# Patient Record
Sex: MALE | Race: WHITE | NOT HISPANIC OR LATINO | Employment: OTHER | URBAN - METROPOLITAN AREA
[De-identification: names, ages, dates, MRNs, and addresses within clinical notes are randomized per-mention and may not be internally consistent; named-entity substitution may affect disease eponyms.]

---

## 2019-04-30 ENCOUNTER — OFFICE VISIT (OUTPATIENT)
Dept: OPHTHALMOLOGY | Facility: CLINIC | Age: 34
End: 2019-04-30
Attending: OPHTHALMOLOGY
Payer: MEDICAID

## 2019-04-30 DIAGNOSIS — D31.41 IRIS NEVUS, RIGHT: Primary | ICD-10-CM

## 2019-04-30 PROCEDURE — G0463 HOSPITAL OUTPT CLINIC VISIT: HCPCS | Mod: ZF

## 2019-04-30 PROCEDURE — 92285 EXTERNAL OCULAR PHOTOGRAPHY: CPT | Mod: ZF | Performed by: OPHTHALMOLOGY

## 2019-04-30 PROCEDURE — 76513 OPH US DX ANT SGM US UNI/BI: CPT | Mod: ZF | Performed by: OPHTHALMOLOGY

## 2019-04-30 ASSESSMENT — TONOMETRY
OS_IOP_MMHG: 17
OD_IOP_MMHG: 18
IOP_METHOD: ICARE

## 2019-04-30 ASSESSMENT — VISUAL ACUITY
OD_SC: 20/20
METHOD: SNELLEN - LINEAR
OS_SC: 20/20

## 2019-04-30 ASSESSMENT — CUP TO DISC RATIO
OS_RATIO: NORMAL
OD_RATIO: NORMAL

## 2019-04-30 ASSESSMENT — SLIT LAMP EXAM - LIDS
COMMENTS: NORMAL
COMMENTS: NORMAL

## 2019-04-30 ASSESSMENT — CONF VISUAL FIELD
OS_NORMAL: 1
OD_NORMAL: 1

## 2019-04-30 NOTE — PROGRESS NOTES
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 Last seen 2015 with Dr. Pa. No new eye redness, changes in vision. Thinks the nevus is the same.     Recently had appendectomy and still recovering. Noticed some floaters each eye while hospitalized, but denies any flashes or black curtain.    Gtts:  None    POH:  -no prior surgery  -iris nevus right eye    A/P:   1. Iris nevus, right eye  -SLP, gonio and UBM done today for comparison  -appears stable on all testing compared to 2015 and 2012  -continue to monitor q1-2 years  -recommend UV protection    2. Vitreous degeneration, each eye  -stable, no PVD  -no RT/RD  -reviewed precautions for RT/RD    F/u 1-2 years, SLP, gonio, UBM OD      Yris Patricio MD  PGY-5, Cornea Fellow  Ophthalmology    Complete documentation of historical and exam elements from today's encounter can be found in the full encounter summary report (not reduplicated in this progress note). I personally obtained the chief complaint(s) and history of present illness.  I confirmed and edited as necessary the review of systems, past medical/surgical history, family history, social history, and examination findings as documented by others; and I examined the patient myself. I personally reviewed the relevant tests, images, and reports as documented above. I formulated and edited as necessary the assessment and plan and discussed the findings and management plan with the patient and family.     Yris Patricio MD  Cornea Fellow

## 2019-04-30 NOTE — NURSING NOTE
Chief Complaints and History of Present Illnesses   Patient presents with     Follow Up     Chief Complaint(s) and History of Present Illness(es)     Follow Up     Laterality: right eye    Onset: 1 year ago              Comments     Iris nevus, right eye  Pt. States that he is doing well.  Has been seeing some floaters when going from light to dark.  No pain BE.  Brittani Flores COT 9:49 AM April 30, 2019

## 2024-06-05 ENCOUNTER — OFFICE VISIT (OUTPATIENT)
Dept: OPHTHALMOLOGY | Facility: CLINIC | Age: 39
End: 2024-06-05
Attending: OPHTHALMOLOGY
Payer: MEDICAID

## 2024-06-05 DIAGNOSIS — D31.41 NEVUS OF IRIS OF RIGHT EYE: Primary | ICD-10-CM

## 2024-06-05 PROCEDURE — 76513 OPH US DX ANT SGM US UNI/BI: CPT | Performed by: OPHTHALMOLOGY

## 2024-06-05 PROCEDURE — 92285 EXTERNAL OCULAR PHOTOGRAPHY: CPT | Performed by: OPHTHALMOLOGY

## 2024-06-05 PROCEDURE — 99203 OFFICE O/P NEW LOW 30 MIN: CPT | Mod: GC | Performed by: OPHTHALMOLOGY

## 2024-06-05 PROCEDURE — G0463 HOSPITAL OUTPT CLINIC VISIT: HCPCS | Performed by: OPHTHALMOLOGY

## 2024-06-05 ASSESSMENT — TONOMETRY
OS_IOP_MMHG: 12
OD_IOP_MMHG: 12
IOP_METHOD: ICARE

## 2024-06-05 ASSESSMENT — CONF VISUAL FIELD
OD_SUPERIOR_TEMPORAL_RESTRICTION: 0
OD_NORMAL: 1
OS_SUPERIOR_TEMPORAL_RESTRICTION: 0
OS_INFERIOR_NASAL_RESTRICTION: 0
OS_INFERIOR_TEMPORAL_RESTRICTION: 0
OS_NORMAL: 1
OS_SUPERIOR_NASAL_RESTRICTION: 0
OD_INFERIOR_NASAL_RESTRICTION: 0
OD_INFERIOR_TEMPORAL_RESTRICTION: 0
OD_SUPERIOR_NASAL_RESTRICTION: 0

## 2024-06-05 ASSESSMENT — VISUAL ACUITY
OD_SC: 20/15
METHOD: SNELLEN - LINEAR
OS_SC: 20/15

## 2024-06-05 ASSESSMENT — SLIT LAMP EXAM - LIDS
COMMENTS: NORMAL
COMMENTS: NORMAL

## 2024-06-05 NOTE — NURSING NOTE
Chief Complaints and History of Present Illnesses   Patient presents with    Corneal Evaluation     Cornea evaluation RE   Iris nevus NATY [facility] 1:14 PM June 5, 2024          Chief Complaint(s) and History of Present Illness(es)       Corneal Evaluation              Laterality: right eye    Onset: 12 years ago    Associated symptoms: Negative for eye pain, pain with eye movement, flashes and floaters    Treatments tried: no treatments    Comments: Cornea evaluation RE   Iris nevus NATY [facility] 1:14 PM June 5, 2024

## 2024-06-05 NOTE — PROGRESS NOTES
Chief complaint   Iris nevus right eye     HPI    Toney A Bullion 38 year old male for follow up of iris nevus. Last seen 2019 with Dr. Patricio      Chief Complaint(s) and History of Present Illness(es)       Corneal Evaluation    In right eye.  This started 12 years ago.  Associated symptoms include Negative for eye pain, pain with eye movement, flashes and floaters.  Treatments tried include no treatments. Additional comments: Cornea evaluation RE   Iris nevus RE  Lety [facility] 1:14 PM June 5, 2024                          Past ocular history   Prior eye surgery/laser/Trauma: None  CTL wearer:No  Glasses : No  Family Hx of eye disease: None    PMH     Past Medical History:   Diagnosis Date    Iris nevus, right        PSH     Past Surgical History:   Procedure Laterality Date    APPENDECTOMY  04/2019       Meds     Current Outpatient Medications   Medication Sig Dispense Refill    ALPRAZolam (XANAX) 1 MG tablet Take 1 mg by mouth as needed      NO ACTIVE MEDICATIONS        No current facility-administered medications for this visit.       Labs   -    Imaging   -    Drops Currently Taking   None    Assessment/Plan 06/05/2024   # Iris nevus, right eye  -SLP, gonio and UBM done today for comparison  -appears stable on all testing compared to 2015   -continue to monitor q1-2 years  -recommend UV protection       Follow up:  Oph: 2 years    Thank you for entrusting us with your care  Lexie Hanson MD, PGY3  Ophthalmology Resident  [facility]  Attending Physician Attestation:  Complete documentation of historical and exam elements from today's encounter can be found in the full encounter summary report (not reduplicated in this progress note).  I personally obtained the chief complaint(s) and history of present illness.  I confirmed and edited as necessary the review of systems, past medical/surgical history, family history, social history, and examination findings as documented by others; and I examined  the patient myself.  I personally reviewed the relevant tests, images, and reports as documented above.  I formulated and edited as necessary the assessment and plan and discussed the findings and management plan with the patient and family. - Andrzej Mancia MD